# Patient Record
Sex: MALE | Race: WHITE | Employment: OTHER | URBAN - METROPOLITAN AREA
[De-identification: names, ages, dates, MRNs, and addresses within clinical notes are randomized per-mention and may not be internally consistent; named-entity substitution may affect disease eponyms.]

---

## 2018-05-25 ENCOUNTER — HOSPITAL ENCOUNTER (OUTPATIENT)
Dept: SURGERY | Age: 58
Discharge: HOME | End: 2018-05-25

## 2018-06-06 RX ORDER — PHENOL/SODIUM PHENOLATE
20 AEROSOL, SPRAY (ML) MUCOUS MEMBRANE
COMMUNITY

## 2018-06-06 NOTE — PERIOP NOTES
Clarified meds with patient. Patient verbalized understanding to take omeprazole, and fluvoxamine dos.

## 2018-06-07 ENCOUNTER — ANESTHESIA EVENT (OUTPATIENT)
Dept: SURGERY | Age: 58
End: 2018-06-07
Payer: COMMERCIAL

## 2018-06-08 ENCOUNTER — HOSPITAL ENCOUNTER (OUTPATIENT)
Age: 58
Setting detail: OUTPATIENT SURGERY
Discharge: HOME | End: 2018-06-08
Attending: PODIATRIST | Admitting: PODIATRIST
Payer: COMMERCIAL

## 2018-06-08 ENCOUNTER — ANESTHESIA (OUTPATIENT)
Dept: SURGERY | Age: 58
End: 2018-06-08
Payer: COMMERCIAL

## 2018-06-08 ENCOUNTER — APPOINTMENT (OUTPATIENT)
Dept: GENERAL RADIOLOGY | Age: 58
End: 2018-06-08
Attending: PODIATRIST
Payer: COMMERCIAL

## 2018-06-08 VITALS
TEMPERATURE: 97.5 F | SYSTOLIC BLOOD PRESSURE: 110 MMHG | RESPIRATION RATE: 14 BRPM | HEART RATE: 49 BPM | BODY MASS INDEX: 19.37 KG/M2 | WEIGHT: 135 LBS | OXYGEN SATURATION: 100 % | DIASTOLIC BLOOD PRESSURE: 65 MMHG

## 2018-06-08 PROCEDURE — 74011250637 HC RX REV CODE- 250/637: Performed by: ANESTHESIOLOGY

## 2018-06-08 PROCEDURE — 76060000033 HC ANESTHESIA 1 TO 1.5 HR: Performed by: PODIATRIST

## 2018-06-08 PROCEDURE — 77030011640 HC PAD GRND REM COVD -A: Performed by: PODIATRIST

## 2018-06-08 PROCEDURE — 74011250636 HC RX REV CODE- 250/636

## 2018-06-08 PROCEDURE — 77030000032 HC CUF TRNQT ZIMM -B: Performed by: PODIATRIST

## 2018-06-08 PROCEDURE — 74011000250 HC RX REV CODE- 250: Performed by: PODIATRIST

## 2018-06-08 PROCEDURE — 77030006788 HC BLD SAW OSC STRY -B: Performed by: PODIATRIST

## 2018-06-08 PROCEDURE — C1776 JOINT DEVICE (IMPLANTABLE): HCPCS | Performed by: PODIATRIST

## 2018-06-08 PROCEDURE — 76010010054 HC POST OP PAIN BLOCK: Performed by: PODIATRIST

## 2018-06-08 PROCEDURE — 77030003602 HC NDL NRV BLK BBMI -B: Performed by: ANESTHESIOLOGY

## 2018-06-08 PROCEDURE — 77030018836 HC SOL IRR NACL ICUM -A: Performed by: PODIATRIST

## 2018-06-08 PROCEDURE — 74011000250 HC RX REV CODE- 250

## 2018-06-08 PROCEDURE — 76210000020 HC REC RM PH II FIRST 0.5 HR: Performed by: PODIATRIST

## 2018-06-08 PROCEDURE — 74011250636 HC RX REV CODE- 250/636: Performed by: PODIATRIST

## 2018-06-08 PROCEDURE — 76942 ECHO GUIDE FOR BIOPSY: CPT | Performed by: PODIATRIST

## 2018-06-08 PROCEDURE — 76010000161 HC OR TIME 1 TO 1.5 HR INTENSV-TIER 1: Performed by: PODIATRIST

## 2018-06-08 PROCEDURE — 77030031139 HC SUT VCRL2 J&J -A: Performed by: PODIATRIST

## 2018-06-08 PROCEDURE — 77030004413 HC BUR OVL STRY -B: Performed by: PODIATRIST

## 2018-06-08 PROCEDURE — 74011250636 HC RX REV CODE- 250/636: Performed by: ANESTHESIOLOGY

## 2018-06-08 PROCEDURE — 76210000063 HC OR PH I REC FIRST 0.5 HR: Performed by: PODIATRIST

## 2018-06-08 DEVICE — JOINT METATRSL MTP 10MM --: Type: IMPLANTABLE DEVICE | Site: TOE | Status: FUNCTIONAL

## 2018-06-08 RX ORDER — CEFAZOLIN SODIUM/WATER 2 G/20 ML
2 SYRINGE (ML) INTRAVENOUS ONCE
Status: COMPLETED | OUTPATIENT
Start: 2018-06-08 | End: 2018-06-08

## 2018-06-08 RX ORDER — ALBUTEROL SULFATE 0.83 MG/ML
2.5 SOLUTION RESPIRATORY (INHALATION) AS NEEDED
Status: DISCONTINUED | OUTPATIENT
Start: 2018-06-08 | End: 2018-06-08 | Stop reason: HOSPADM

## 2018-06-08 RX ORDER — SODIUM CHLORIDE 0.9 % (FLUSH) 0.9 %
5-10 SYRINGE (ML) INJECTION AS NEEDED
Status: DISCONTINUED | OUTPATIENT
Start: 2018-06-08 | End: 2018-06-08 | Stop reason: HOSPADM

## 2018-06-08 RX ORDER — HYDROMORPHONE HYDROCHLORIDE 2 MG/ML
0.5 INJECTION, SOLUTION INTRAMUSCULAR; INTRAVENOUS; SUBCUTANEOUS
Status: DISCONTINUED | OUTPATIENT
Start: 2018-06-08 | End: 2018-06-08 | Stop reason: HOSPADM

## 2018-06-08 RX ORDER — SODIUM CHLORIDE, SODIUM LACTATE, POTASSIUM CHLORIDE, CALCIUM CHLORIDE 600; 310; 30; 20 MG/100ML; MG/100ML; MG/100ML; MG/100ML
1000 INJECTION, SOLUTION INTRAVENOUS CONTINUOUS
Status: DISCONTINUED | OUTPATIENT
Start: 2018-06-08 | End: 2018-06-08 | Stop reason: HOSPADM

## 2018-06-08 RX ORDER — DEXAMETHASONE SODIUM PHOSPHATE 100 MG/10ML
INJECTION INTRAMUSCULAR; INTRAVENOUS AS NEEDED
Status: DISCONTINUED | OUTPATIENT
Start: 2018-06-08 | End: 2018-06-08 | Stop reason: HOSPADM

## 2018-06-08 RX ORDER — EPHEDRINE SULFATE 50 MG/ML
INJECTION, SOLUTION INTRAVENOUS AS NEEDED
Status: DISCONTINUED | OUTPATIENT
Start: 2018-06-08 | End: 2018-06-08 | Stop reason: HOSPADM

## 2018-06-08 RX ORDER — MIDAZOLAM HYDROCHLORIDE 1 MG/ML
2 INJECTION, SOLUTION INTRAMUSCULAR; INTRAVENOUS
Status: COMPLETED | OUTPATIENT
Start: 2018-06-08 | End: 2018-06-08

## 2018-06-08 RX ORDER — ROPIVACAINE HYDROCHLORIDE 5 MG/ML
INJECTION, SOLUTION EPIDURAL; INFILTRATION; PERINEURAL AS NEEDED
Status: DISCONTINUED | OUTPATIENT
Start: 2018-06-08 | End: 2018-06-08 | Stop reason: HOSPADM

## 2018-06-08 RX ORDER — DEXAMETHASONE SODIUM PHOSPHATE 4 MG/ML
INJECTION, SOLUTION INTRA-ARTICULAR; INTRALESIONAL; INTRAMUSCULAR; INTRAVENOUS; SOFT TISSUE AS NEEDED
Status: DISCONTINUED | OUTPATIENT
Start: 2018-06-08 | End: 2018-06-08 | Stop reason: HOSPADM

## 2018-06-08 RX ORDER — ACETAMINOPHEN 500 MG
1000 TABLET ORAL ONCE
Status: COMPLETED | OUTPATIENT
Start: 2018-06-08 | End: 2018-06-08

## 2018-06-08 RX ORDER — SODIUM CHLORIDE 0.9 % (FLUSH) 0.9 %
5-10 SYRINGE (ML) INJECTION EVERY 8 HOURS
Status: DISCONTINUED | OUTPATIENT
Start: 2018-06-08 | End: 2018-06-08 | Stop reason: HOSPADM

## 2018-06-08 RX ORDER — PROPOFOL 10 MG/ML
INJECTION, EMULSION INTRAVENOUS AS NEEDED
Status: DISCONTINUED | OUTPATIENT
Start: 2018-06-08 | End: 2018-06-08 | Stop reason: HOSPADM

## 2018-06-08 RX ORDER — LIDOCAINE HYDROCHLORIDE 20 MG/ML
INJECTION, SOLUTION EPIDURAL; INFILTRATION; INTRACAUDAL; PERINEURAL AS NEEDED
Status: DISCONTINUED | OUTPATIENT
Start: 2018-06-08 | End: 2018-06-08 | Stop reason: HOSPADM

## 2018-06-08 RX ORDER — LIDOCAINE HYDROCHLORIDE 10 MG/ML
0.1 INJECTION INFILTRATION; PERINEURAL AS NEEDED
Status: DISCONTINUED | OUTPATIENT
Start: 2018-06-08 | End: 2018-06-08 | Stop reason: HOSPADM

## 2018-06-08 RX ORDER — BUPIVACAINE HYDROCHLORIDE 2.5 MG/ML
INJECTION, SOLUTION EPIDURAL; INFILTRATION; INTRACAUDAL AS NEEDED
Status: DISCONTINUED | OUTPATIENT
Start: 2018-06-08 | End: 2018-06-08 | Stop reason: HOSPADM

## 2018-06-08 RX ORDER — ONDANSETRON 2 MG/ML
4 INJECTION INTRAMUSCULAR; INTRAVENOUS
Status: DISCONTINUED | OUTPATIENT
Start: 2018-06-08 | End: 2018-06-08 | Stop reason: HOSPADM

## 2018-06-08 RX ORDER — PROPOFOL 10 MG/ML
INJECTION, EMULSION INTRAVENOUS
Status: DISCONTINUED | OUTPATIENT
Start: 2018-06-08 | End: 2018-06-08 | Stop reason: HOSPADM

## 2018-06-08 RX ADMIN — LIDOCAINE HYDROCHLORIDE 80 MG: 20 INJECTION, SOLUTION EPIDURAL; INFILTRATION; INTRACAUDAL; PERINEURAL at 10:34

## 2018-06-08 RX ADMIN — ACETAMINOPHEN 1000 MG: 500 TABLET, FILM COATED ORAL at 10:06

## 2018-06-08 RX ADMIN — PROPOFOL 20 MG: 10 INJECTION, EMULSION INTRAVENOUS at 10:36

## 2018-06-08 RX ADMIN — PROPOFOL 50 MG: 10 INJECTION, EMULSION INTRAVENOUS at 10:34

## 2018-06-08 RX ADMIN — MIDAZOLAM HYDROCHLORIDE 2 MG: 1 INJECTION, SOLUTION INTRAMUSCULAR; INTRAVENOUS at 10:11

## 2018-06-08 RX ADMIN — ROPIVACAINE HYDROCHLORIDE 30 ML: 5 INJECTION, SOLUTION EPIDURAL; INFILTRATION; PERINEURAL at 10:18

## 2018-06-08 RX ADMIN — EPHEDRINE SULFATE 5 MG: 50 INJECTION, SOLUTION INTRAVENOUS at 10:51

## 2018-06-08 RX ADMIN — PROPOFOL 140 MCG/KG/MIN: 10 INJECTION, EMULSION INTRAVENOUS at 10:34

## 2018-06-08 RX ADMIN — SODIUM CHLORIDE, SODIUM LACTATE, POTASSIUM CHLORIDE, AND CALCIUM CHLORIDE 1000 ML: 600; 310; 30; 20 INJECTION, SOLUTION INTRAVENOUS at 10:07

## 2018-06-08 RX ADMIN — Medication 2 G: at 10:38

## 2018-06-08 RX ADMIN — EPHEDRINE SULFATE 5 MG: 50 INJECTION, SOLUTION INTRAVENOUS at 11:03

## 2018-06-08 RX ADMIN — DEXAMETHASONE SODIUM PHOSPHATE 4 MG: 4 INJECTION, SOLUTION INTRA-ARTICULAR; INTRALESIONAL; INTRAMUSCULAR; INTRAVENOUS; SOFT TISSUE at 10:42

## 2018-06-08 NOTE — ANESTHESIA PREPROCEDURE EVALUATION
Anesthetic History   No history of anesthetic complications            Review of Systems / Medical History  Patient summary reviewed and pertinent labs reviewed    Pulmonary  Within defined limits                 Neuro/Psych   Within defined limits           Cardiovascular    Hypertension              Exercise tolerance: >4 METS     GI/Hepatic/Renal     GERD: well controlled           Endo/Other        Arthritis     Other Findings              Physical Exam    Airway  Mallampati: II  TM Distance: 4 - 6 cm  Neck ROM: normal range of motion   Mouth opening: Normal     Cardiovascular    Rhythm: regular  Rate: normal      Pertinent negatives: No murmur, JVD and peripheral edema   Dental  No notable dental hx       Pulmonary  Breath sounds clear to auscultation               Abdominal         Other Findings            Anesthetic Plan    ASA: 2  Anesthesia type: total IV anesthesia      Post-op pain plan if not by surgeon: peripheral nerve block single    Induction: Intravenous  Anesthetic plan and risks discussed with: Patient

## 2018-06-08 NOTE — ANESTHESIA POSTPROCEDURE EVALUATION
Post-Anesthesia Evaluation and Assessment    Patient: Cecile Molina MRN: [id number]  SSN: [id number]    YOB: 1960  Age: 65 y.o. Sex: male       Cardiovascular Function/Vital Signs  Visit Vitals    /74    Pulse (!) 39    Temp 36.5 °C (97.7 °F)    Resp 16    Wt 61.2 kg (135 lb)    SpO2 100%    BMI 19.37 kg/m2       Patient is status post No value filed. anesthesia for Procedure(s):  LEFT FOOT 1ST MTP JOINT CHEILECTOMY WITH CARTIVA IMPLANT. Nausea/Vomiting: None    Postoperative hydration reviewed and adequate. Pain:  Pain Scale 1: Numeric (0 - 10) (06/08/18 0910)  Pain Intensity 1: 4 (06/08/18 0910)   Managed    Neurological Status:   Neuro (WDL): Within Defined Limits (06/08/18 0912)   At baseline    Mental Status and Level of Consciousness: Arousable    Pulmonary Status:   O2 Device: Nasal cannula (06/08/18 1011)   Adequate oxygenation and airway patent    Complications related to anesthesia: None    Post-anesthesia assessment completed.  No concerns    Signed By: Claudia Maurer MD     June 8, 2018

## 2018-06-08 NOTE — DISCHARGE INSTRUCTIONS
Please schedule a follow-up appointment for this patient with Dr. Anuja Ruiz [facility] - [phone] for 1 week. Please update the discharge instructions with the appointment details. Post Op Podiatric Surgery Orders    1. Elevate foot / ankle. 2. Ice to foot / ankle. 3. Post op shoe walk as tolerated. 4. Manage pain as per anesthesia. 5. Follow up appointment is on: one week      at the [facility] office. ACTIVITY  · As tolerated and as directed by your doctor. · Bathe or shower as directed by your doctor. DIET  · Clear liquids until no nausea or vomiting; then light diet for the first day. · Advance to regular diet on second day, unless your doctor orders otherwise. · If nausea and vomiting continues, call your doctor. PAIN  · Take pain medication as directed by your doctor. · Call your doctor if pain is NOT relieved by medication. · DO NOT take aspirin of blood thinners unless directed by your doctor. DRESSING CARE       CALL YOUR DOCTOR IF   · Excessive bleeding that does not stop after holding pressure over the area  · Temperature of 101 degrees F or above  · Excessive redness, swelling or bruising, and/ or green or yellow, smelly discharge from incision    AFTER ANESTHESIA   · For the first 24 hours: DO NOT Drive, Drink alcoholic beverages, or Make important decisions. · Be aware of dizziness following anesthesia and while taking pain medication. APPOINTMENT DATE/ TIME    YOUR DOCTOR'S PHONE NUMBER       DISCHARGE SUMMARY from Nurse    PATIENT INSTRUCTIONS:    After general anesthesia or intravenous sedation, for 24 hours or while taking prescription Narcotics:  · Limit your activities  · Do not drive and operate hazardous machinery  · Do not make important personal or business decisions  · Do  not drink alcoholic beverages  · If you have not urinated within 8 hours after discharge, please contact your surgeon on call.     *  Please give a list of your current medications to your Primary Care Provider. *  Please update this list whenever your medications are discontinued, doses are      changed, or new medications (including over-the-counter products) are added. *  Please carry medication information at all times in case of emergency situations. These are general instructions for a healthy lifestyle:    No smoking/ No tobacco products/ Avoid exposure to second hand smoke    Surgeon General's Warning:  Quitting smoking now greatly reduces serious risk to your health. Obesity, smoking, and sedentary lifestyle greatly increases your risk for illness    A healthy diet, regular physical exercise & weight monitoring are important for maintaining a healthy lifestyle    You may be retaining fluid if you have a history of heart failure or if you experience any of the following symptoms:  Weight gain of 3 pounds or more overnight or 5 pounds in a week, increased swelling in our hands or feet or shortness of breath while lying flat in bed. Please call your doctor as soon as you notice any of these symptoms; do not wait until your next office visit. Recognize signs and symptoms of STROKE:    F-face looks uneven    A-arms unable to move or move unevenly    S-speech slurred or non-existent    T-time-call 911 as soon as signs and symptoms begin-DO NOT go       Back to bed or wait to see if you get better-TIME IS BRAIN.

## 2018-06-08 NOTE — OP NOTES
[facility]  OPERATIVE REPORT    Yaron Ribeiro  MR#: [id number]  : 1960  ACCOUNT #: [de-identified]   DATE OF SERVICE: 2018    SURGEON:  Olya Klein DPM    ASSISTANT SURGEON:  None. PREOPERATIVE DIAGNOSES:  1.  Painful hallux rigidus with arthritis, left big toe joint. 2.  Pain. 3.  Difficulty walking. POSTOPERATIVE DIAGNOSES:  1.  Painful hallux rigidus with arthritis, left big toe joint. 2.  Pain. 3.  Difficulty walking. PROCEDURE:  First MTP joint cheilectomy with Cartiva implant, left first MTP joint. ANESTHESIA:  IV sedation with local, regional consisting of a popliteal saphenous nerve block to the left lower extremity and postop local into the foot. HEMOSTASIS:  Ankle pneumatic tourniquet inflated to 250 mmHg for 47 total minutes. ESTIMATED BLOOD LOSS:  Less than 1 mL. IMPLANTS AND MATERIALS:  3-0 Vicryl, 4-0 Vicryl, 5-0 Vicryl, 10 mm Cartiva implant. PATHOLOGY:  None. COMPLICATIONS:  None. INDICATIONS FOR PROCEDURE:  The patient presented to my office with a long-term history of left first MTP joint pain and thickness around the joint which causes discomfort with activities at times. It has gotten worse over the years and after conservative care and reviewing the radiographs with him in detail, we elected surgical management by cleaning up the joint and placing a Cartiva implant to hopefully provide long-term decreased pain. He agrees and I did obtain informed consent, answering all his questions in the office preoperatively. DESCRIPTION OF OPERATION:  The patient brought into the operating room, placed in a supine position on the operating table. Following IV sedation and the patient receiving a popliteal saphenous nerve block in the left lower extremity, the foot was scrubbed, prepped, and draped in the usual sterile fashion.     Attention was now directed to the dorsal medial surface of the first MTP joint where a linear incision approximately 5 cm in length was made. Following sharp and blunt dissection, care to preserve major neurovascular structures, the incision was taken down to the level of the first MTP joint capsule. A linear capsulotomy was performed and there was a significant amount of hypertrophic bone and loose osseous bony fragments periarticular big toe joint. Further blunt dissection to dissect laterally and medially the capsule of the big toe joint exposed all this hypertrophic bone, which I removed with power and blunt instrumentation. Further inspection of the first metatarsal head showed a significant central cartilaginous defect. There were also signs of mild synovitis around the capsule and within the joint along with possible gouty tophi. The wound was copiously irrigated with normal sterile saline several times. I used a McGlamry elevator at this point to free up any plantar tissues around the joint and hopefully this will allow an increase in dorsiflexion of the big toe long-term postoperatively. I then now proceeded to use a rotary bur to smooth down any rough osseous edges on both sides of the joint and then placed the 10 mm Cartiva implant into the first metatarsal head in typical fashion. The wound was copiously irrigated with normal sterile saline several times. I then sewed and sutured the capsule with 3-0 Vicryl, deep and superficial with 4-0 Vicryl, skin with 5-0 Vicryl in subcuticular fashion. The C-arm was now brought into the field for intraoperative fluoroscopy and there was a significant amount of increase in joint space on the lateral and the AP views, with the implant found to be seated well in the first metatarsal head, slightly protruding into the joint. At this point, I injected a total of 6 mL in a 5:1 mixture of 0.25% plain Marcaine and dex phosphate 4 mg in a Porter block fashion. Steri-Strips were applied. Adaptic, 4 x 4 bandages, Sarha, and Coban. The patient tolerated both the procedure and anesthesia without apparent complications and vital signs remained stable throughout the procedure. The patient transported from the operating room to recovery room with vascular status intact to his left foot and vital signs normal.  Good capillary refill was noted to reoccur in all 5 toes on the left foot. The patient's family friend was given written and verbal postop instructions along with a pain prescription, antibiotic, and he will follow up in the office in 1 week.       ANNETTE Dhaliwal / Madiha Alvarado  D: 06/08/2018 12:03     T: 06/08/2018 13:43  JOB #: [id number]  CC: _____ _____

## 2018-06-08 NOTE — ANESTHESIA PROCEDURE NOTES
Peripheral Block    Start time: 6/8/2018 10:15 AM  End time: 6/8/2018 10:18 AM  Performed by: Julia Garcia  Authorized by: Julia Garcia       Pre-procedure: Indications: at surgeon's request and post-op pain management    Preanesthetic Checklist: patient identified, risks and benefits discussed, site marked, timeout performed, anesthesia consent given and patient being monitored    Timeout Time: 10:15          Block Type:   Block Type:   Adductor canal  Laterality:  Left  Monitoring:  Continuous pulse ox, frequent vital sign checks, heart rate, oxygen and responsive to questions  Injection Technique:  Single shot  Procedures: ultrasound guided    Patient Position: supine  Prep: chlorhexidine    Location:  Mid thigh  Needle Gauge:  20 G  Needle Localization:  Ultrasound guidance  Medication Injected:  0.5%  ropivacaine  Volume (mL):  12    Assessment:  Number of attempts:  1  Injection Assessment:  Incremental injection every 5 mL, local visualized surrounding nerve on ultrasound, negative aspiration for blood, no intravascular symptoms, no paresthesia and ultrasound image on chart  Patient tolerance:  Patient tolerated the procedure well with no immediate complications

## 2018-06-08 NOTE — BRIEF OP NOTE
BRIEF OPERATIVE NOTE    Date of Procedure: 6/8/2018   Preoperative Diagnosis: Hallux rigidus of left foot [M20.22]  Foot pain, left [M79.672]  Postoperative Diagnosis: Hallux rigidus of left foot [M20.22]  Foot pain, left [M79.672]    Procedure(s):  LEFT FOOT 1ST MTP JOINT CHEILECTOMY WITH CARTIVA IMPLANT  Surgeon(s) and Role:     * Bartolo Santiago DPM - Primary         Surgical Assistant: none    Surgical Staff:  Circ-1: Beecher Blizzard, RN  Circ-Relief: Felipe Crum RN  Radiology Technician: Lito Yarbrough  Scrub Tech-1: Baljit Bazzi  Event Time In   Incision Start 1045   Incision Close 1131     Anesthesia: Regional with post op local to the left foot  Estimated Blood Loss: less than 1 cc  Specimens: * No specimens in log *   Findings: hypertrophic 1st mtp joint with loose bony fragments left foot  Complications: none  Implants:   Implant Name Type Inv.  Item Serial No.  Lot No. LRB No. Used Action   JOINT METATRSL MTP 10MM --  - [id number]   JOINT METATRSL MTP 10MM --    [employer]ART[employer] [id number] Left 1 Implanted

## 2018-06-08 NOTE — IP AVS SNAPSHOT
[address] [facility] 
 
 
 [address] 
[phone] Patient: Diomedes Schuler MRN: [id number] :1960 About your hospitalization You were admitted on:  2018 You last received care in the:  [facility] OP PACU You were discharged on:  2018 Why you were hospitalized Your primary diagnosis was:  Not on File Follow-up Information Follow up With Details Comments Contact Info Anahi Mayer MD   [facility] [address] 
[phone] Follow up in 1 week(s) For wound re-check [place] ANNETTE Hernandez  Keep follow up appointment as scheduled. [address] [facility] [employer] [address] [place] [address] 
[phone] Discharge Orders None A check berhane indicates which time of day the medication should be taken. My Medications CONTINUE taking these medications Instructions Each Dose to Equal  
 Morning Noon Evening Bedtime  
 atenolol 100 mg tablet Commonly known as:  TENORMIN Your last dose was: Your next dose is: Take 100 mg by mouth daily. Indications: pm  
 100 mg  
    
   
   
   
  
 D3-2000 PO Your last dose was: Your next dose is: Take 1 Cap by mouth. 1 Cap  
    
   
   
   
  
 diazePAM 5 mg tablet Commonly known as:  VALIUM Your last dose was: Your next dose is: Take 5 mg by mouth nightly. 5 mg FISH  mg Cap Generic drug:  Omega-3 Fatty Acids Your last dose was: Your next dose is: Take 1 Cap by mouth. 1 Cap  
    
   
   
   
  
 fluvoxaMINE 100 mg tablet Commonly known as:  Liz Pope Your last dose was: Your next dose is: Take 100 mg by mouth two (2) times a day. Takes 1/2 in the am and pm.  
 100 mg  
    
   
   
   
  
 lisinopril 10 mg tablet Commonly known as:  Toney Triana Your last dose was: Your next dose is: Take 10 mg by mouth daily. Indications: pm  
 10 mg MOTRIN  mg tablet Generic drug:  ibuprofen Your last dose was: Your next dose is: Take 200 mg by mouth daily. 200 mg  
    
   
   
   
  
 multivitamin tablet Commonly known as:  ONE A DAY Your last dose was: Your next dose is: Take 1 Tab by mouth daily. 1 Tab Omeprazole delayed release 20 mg tablet Commonly known as:  PRILOSEC D/R Your last dose was: Your next dose is: Take 20 mg by mouth every morning. 20 mg  
    
   
   
   
  
 TURMERIC (BULK) Your last dose was: Your next dose is:    
   
   
 1 Cap by Does Not Apply route. 1 Cap Discharge Instructions Please schedule a follow-up appointment for this patient with Dr. Cynthia Orona [facility] [phone]) for 1 week. Please update the discharge instructions with the appointment details. [facility] Podiatric Surgery Orders 1. Elevate foot / ankle. 2. Ice to foot / ankle. 3. Post op shoe walk as tolerated. 4. Manage pain as per anesthesia. 5. Follow up appointment is on: one week 
    at the [place] office. ACTIVITY · As tolerated and as directed by your doctor. · Bathe or shower as directed by your doctor. DIET · Clear liquids until no nausea or vomiting; then light diet for the first day. · Advance to regular diet on second day, unless your doctor orders otherwise. · If nausea and vomiting continues, call your doctor. PAIN 
· Take pain medication as directed by your doctor. · Call your doctor if pain is NOT relieved by medication. · DO NOT take aspirin of blood thinners unless directed by your doctor.   
 
DRESSING CARE  
 
 
CALL YOUR DOCTOR IF  
 · Excessive bleeding that does not stop after holding pressure over the area · Temperature of 101 degrees F or above · Excessive redness, swelling or bruising, and/ or green or yellow, smelly discharge from incision AFTER ANESTHESIA · For the first 24 hours: DO NOT Drive, Drink alcoholic beverages, or Make important decisions. · Be aware of dizziness following anesthesia and while taking pain medication. APPOINTMENT DATE/ TIME 
 
YOUR DOCTOR'S PHONE NUMBER  
 
 
DISCHARGE SUMMARY from Nurse PATIENT INSTRUCTIONS: 
 
After general anesthesia or intravenous sedation, for 24 hours or while taking prescription Narcotics: · Limit your activities · Do not drive and operate hazardous machinery · Do not make important personal or business decisions · Do  not drink alcoholic beverages · If you have not urinated within 8 hours after discharge, please contact your surgeon on call. *  Please give a list of your current medications to your Primary Care Provider. *  Please update this list whenever your medications are discontinued, doses are 
    changed, or new medications (including over-the-counter products) are added. *  Please carry medication information at all times in case of emergency situations. These are general instructions for a healthy lifestyle: No smoking/ No tobacco products/ Avoid exposure to second hand smoke Surgeon General's Warning:  Quitting smoking now greatly reduces serious risk to your health. Obesity, smoking, and sedentary lifestyle greatly increases your risk for illness A healthy diet, regular physical exercise & weight monitoring are important for maintaining a healthy lifestyle You may be retaining fluid if you have a history of heart failure or if you experience any of the following symptoms:  Weight gain of 3 pounds or more overnight or 5 pounds in a week, increased swelling in our hands or feet or shortness of breath while lying flat in bed. Please call your doctor as soon as you notice any of these symptoms; do not wait until your next office visit. Recognize signs and symptoms of STROKE: 
 
F-face looks uneven A-arms unable to move or move unevenly S-speech slurred or non-existent T-time-call 911 as soon as signs and symptoms begin-DO NOT go Back to bed or wait to see if you get better-TIME IS BRAIN. Introducing [facility]! Sapna Thomas introduces [employer] patient portal. Now you can access parts of your medical record, email your doctor's office, and request medication refills online. 1. In your internet browser, go to [email or web address] 2. Click on the First Time User? Click Here link in the Sign In box. You will see the New Member Sign Up page. 3. Enter your [employer] Access Code exactly as it appears below. You will not need to use this code after youve completed the sign-up process. If you do not sign up before the expiration date, you must request a new code. · [employer] Access Code: [email or web address] Expires: 9/3/2018  3:44 PM 
 
4. Enter the last four digits of your Social Security Number (xxxx) and Date of Birth (mm/dd/yyyy) as indicated and click Submit. You will be taken to the next sign-up page. 5. Create a [employer] ID. This will be your [employer] login ID and cannot be changed, so think of one that is secure and easy to remember. 6. Create a [employer] password. You can change your password at any time. 7. Enter your Password Reset Question and Answer. This can be used at a later time if you forget your password. 8. Enter your e-mail address. You will receive e-mail notification when new information is available in [address] 9. Click Sign Up. You can now view and download portions of your medical record. 10. Click the Download Summary menu link to download a portable copy of your medical information. If you have questions, please visit the Frequently Asked Questions section of the MyChart website. Remember, [employer] is NOT to be used for urgent needs. For medical emergencies, dial 911. Now available from your iPhone and Android! Introducing [employer] Ugarte As a [employer] patient, I wanted to make you aware of our electronic visit tool called [employer] U[employer]e. [employer] 24/7 allows you to connect within minutes with a medical provider 24 hours a day, seven days a week via a mobile device or tablet or logging into a secure website from your computer. You can access Bj Ugarte from anywhere in the [place]. A virtual visit might be right for you when you have a simple condition and feel like you just dont want to get out of bed, or cant get away from work for an appointment, when your regular [employer] provider is not available (evenings, weekends or holidays), or when youre out of town and need minor care. Electronic visits cost only $49 and if the [employer] 24/7 provider determines a prescription is needed to treat your condition, one can be electronically transmitted to a nearby pharmacy*. Please take a moment to enroll today if you have not already done so. The enrollment process is free and takes just a few minutes. To enroll, please download the [employer] 24/7 linda to your tablet or phone, or visit [email or web address] to enroll on your computer. And, as an [address] [place] [address] patient with a [employer] account, the results of your visits will be scanned into your electronic medical record and your primary care provider will be able to view the scanned results. We urge you to continue to see your regular [employer] provider for your ongoing medical care.   And while your primary care provider may not be the one available when you seek a Bj Ugarte virtual visit, the peace of mind you get from getting a real diagnosis real time can be priceless. For more information on Bj Buckdeept[employer], view our Frequently Asked Questions (FAQs) at [email or web address]. Sincerely, 
 
Jewell Jackson MD 
Chief Medical Officer [address] *:  certain medications cannot be prescribed via [employer]gart[employer] Unresulted Labs-Please follow up with your PCP about these lab tests Order Current Status NC XR TECHNOLOGIST SERVICE In process Providers Seen During Your Hospitalization Provider Specialty Primary office phone [facility] [phone], 1400 [facility] [phone] Your Primary Care Physician (PCP) Primary Care Physician Office Phone Office Fax 89 [facility], [address] [place] [address] You are allergic to the following Allergen Reactions Sulfa (Sulfonamide Antibiotics) Rash Recent Documentation Weight BMI Smoking Status 61.2 kg 19.37 kg/m2 Former Smoker Emergency Contacts Name Discharge Info Relation Home Work Mobile Minh Brusnon  Mother [14] [phone] Tiera Tapia  Sister [23] [phone] Patient Belongings The following personal items are in your possession at time of discharge: 
  Dental Appliances: None         Home Medications: None   Jewelry: None  Clothing: Undergarments, Pants, Shirt    Other Valuables: None Please provide this summary of care documentation to your next provider. Signatures-by signing, you are acknowledging that this After Visit Summary has been reviewed with you and you have received a copy. Patient Signature:  ____________________________________________________________ Date:  ____________________________________________________________  
  
Cristopher Sahni Provider Signature:  ____________________________________________________________ Date:  ____________________________________________________________

## 2018-06-08 NOTE — ANESTHESIA PROCEDURE NOTES
Peripheral Block    Start time: 6/8/2018 10:11 AM  End time: 6/8/2018 10:15 AM  Performed by: Suhail Winston  Authorized by: Suhail Winston       Pre-procedure:    Indications: at surgeon's request and post-op pain management    Preanesthetic Checklist: patient identified, risks and benefits discussed, site marked, timeout performed, anesthesia consent given and patient being monitored    Timeout Time: 10:11          Block Type:   Block Type:  Popliteal  Laterality:  Left  Monitoring:  Continuous pulse ox, frequent vital sign checks, heart rate, oxygen and responsive to questions  Injection Technique:  Single shot  Procedures: ultrasound guided    Prep: chlorhexidine    Location:  Lower thigh  Needle Type:  Stimuplex  Needle Gauge:  20 G  Needle Localization:  Ultrasound guidance  Medication Injected:  0.5%  ropivacaine  Volume (mL):  18    Assessment:  Number of attempts:  1  Injection Assessment:  Incremental injection every 5 mL, local visualized surrounding nerve on ultrasound, negative aspiration for blood, no intravascular symptoms, no paresthesia and ultrasound image on chart  Patient tolerance:  Patient tolerated the procedure well with no immediate complications

## 2018-08-31 NOTE — H&P
Patient: Bethany Fung MRN: [id number]  SSN: [id number] YOB: 1960  Age: 63 y.o. Sex: male History and Physical 
 
Logan Carroll is a 63 y.o. male having Procedure(s): LEFT FOOT 1ST MTP JOINT CHEILECTOMY WITH CARTIVA IMPLANT. Allergies: Allergies Allergen Reactions  Sulfa (Sulfonamide Antibiotics) Rash Chief Complaint: foot pain History of Present Illness: 64 yo M failed medical therapy and presents for surgical treatment. Past Medical History:  
Diagnosis Date  Arthritis   
 osteoarthritis  Chronic pain   
 arthritis  GERD (gastroesophageal reflux disease) omeprazole daily--well controlled.  Hypertension Past Surgical History:  
Procedure Laterality Date [address]  HX ORTHOPAEDIC  1970  
 hurmerous fx- steel plate placed x2  HX TYMPANOSTOMY    
 x 3 as a child Family History Problem Relation Age of Onset  Cancer Mother  Diabetes Father  Heart Disease Father  Cancer Father  Mental Retardation Father Social History Substance Use Topics  Smoking status: Former Smoker Packs/day: 1.00 Years: 10.00 Quit date: 1965  Smokeless tobacco: Never Used  Alcohol use No  
  
 
Prior to Admission medications Medication Sig Start Date End Date Taking? Authorizing Provider Omeprazole delayed release (PRILOSEC D/R) 20 mg tablet Take 20 mg by mouth every morning. Yes Historical Provider  
atenolol (TENORMIN) 100 mg tablet Take 100 mg by mouth daily. Indications: pm   Yes Historical Provider  
lisinopril (PRINIVIL, ZESTRIL) 10 mg tablet Take 10 mg by mouth daily. Indications: pm   Yes Historical Provider  
fluvoxaMINE (LUVOX) 100 mg tablet Take 100 mg by mouth two (2) times a day. Takes 1/2 in the am and pm.   Yes Historical Provider  
diazePAM (VALIUM) 5 mg tablet Take 5 mg by mouth nightly. Yes Historical Provider multivitamin (ONE A DAY) tablet Take 1 Tab by mouth daily. Historical Provider Omega-3 Fatty Acids (FISH OIL) 500 mg cap Take 1 Cap by mouth. Historical Provider  
cholecalciferol, vitamin D3, (D3-2000 PO) Take 1 Cap by mouth. Historical Provider TURMERIC, BULK, 1 Cap by Does Not Apply route. Historical Provider  
ibuprofen (MOTRIN IB) 200 mg tablet Take 200 mg by mouth daily. Historical Provider Visit Vitals  /65  Pulse (!) 49  Temp 36.4 °C (97.5 °F)  Resp 14  Wt 61.2 kg (135 lb)  SpO2 100%  BMI 19.37 kg/m2 Assessment and Plan:  
Amanda Haider is a 63 y.o. male having Procedure(s): LEFT FOOT 1ST MTP JOINT CHEILECTOMY WITH CARTIVA IMPLANT for foot pain. Preanesthesia Evaluation No anesthesia note exists Signed By: Radha Mares MD   
 August 31, 2018

## 2019-12-30 ENCOUNTER — ANESTHESIA EVENT (OUTPATIENT)
Dept: SURGERY | Age: 59
End: 2019-12-30
Payer: COMMERCIAL

## 2019-12-31 ENCOUNTER — HOSPITAL ENCOUNTER (OUTPATIENT)
Age: 59
Setting detail: OUTPATIENT SURGERY
Discharge: HOME | End: 2019-12-31
Attending: PODIATRIST | Admitting: PODIATRIST
Payer: COMMERCIAL

## 2019-12-31 ENCOUNTER — ANESTHESIA (OUTPATIENT)
Dept: SURGERY | Age: 59
End: 2019-12-31
Payer: COMMERCIAL

## 2019-12-31 ENCOUNTER — APPOINTMENT (OUTPATIENT)
Dept: GENERAL RADIOLOGY | Age: 59
End: 2019-12-31
Attending: PODIATRIST
Payer: COMMERCIAL

## 2019-12-31 VITALS
RESPIRATION RATE: 16 BRPM | SYSTOLIC BLOOD PRESSURE: 125 MMHG | HEIGHT: 70 IN | DIASTOLIC BLOOD PRESSURE: 80 MMHG | TEMPERATURE: 98.5 F | HEART RATE: 52 BPM | WEIGHT: 170 LBS | BODY MASS INDEX: 24.34 KG/M2 | OXYGEN SATURATION: 97 %

## 2019-12-31 PROCEDURE — C1713 ANCHOR/SCREW BN/BN,TIS/BN: HCPCS | Performed by: PODIATRIST

## 2019-12-31 PROCEDURE — 77030036714 HC WRE K FIXOS STRY -B: Performed by: PODIATRIST

## 2019-12-31 PROCEDURE — 77030040198: Performed by: PODIATRIST

## 2019-12-31 PROCEDURE — 77030018836 HC SOL IRR NACL ICUM -A: Performed by: PODIATRIST

## 2019-12-31 PROCEDURE — C1769 GUIDE WIRE: HCPCS | Performed by: PODIATRIST

## 2019-12-31 PROCEDURE — 77030039543 HC COUNTRSNK FIXOS DISP STRY -C: Performed by: PODIATRIST

## 2019-12-31 PROCEDURE — 76010010054 HC POST OP PAIN BLOCK: Performed by: PODIATRIST

## 2019-12-31 PROCEDURE — 77030033138 HC SUT PGA STRATFX J&J -B: Performed by: PODIATRIST

## 2019-12-31 PROCEDURE — 77030031139 HC SUT VCRL2 J&J -A: Performed by: PODIATRIST

## 2019-12-31 PROCEDURE — 77030003602 HC NDL NRV BLK BBMI -B: Performed by: ANESTHESIOLOGY

## 2019-12-31 PROCEDURE — 76210000063 HC OR PH I REC FIRST 0.5 HR: Performed by: PODIATRIST

## 2019-12-31 PROCEDURE — 74011250636 HC RX REV CODE- 250/636: Performed by: ANESTHESIOLOGY

## 2019-12-31 PROCEDURE — 77030037713 HC CLOSR DEV INCIS ZIP STRY -B: Performed by: PODIATRIST

## 2019-12-31 PROCEDURE — 77030032490 HC SLV COMPR SCD KNE COVD -B: Performed by: PODIATRIST

## 2019-12-31 PROCEDURE — 74011250636 HC RX REV CODE- 250/636: Performed by: NURSE ANESTHETIST, CERTIFIED REGISTERED

## 2019-12-31 PROCEDURE — 74011250636 HC RX REV CODE- 250/636: Performed by: PODIATRIST

## 2019-12-31 PROCEDURE — 74011000250 HC RX REV CODE- 250: Performed by: PODIATRIST

## 2019-12-31 PROCEDURE — 76060000035 HC ANESTHESIA 2 TO 2.5 HR: Performed by: PODIATRIST

## 2019-12-31 PROCEDURE — 76010000162 HC OR TIME 1.5 TO 2 HR INTENSV-TIER 1: Performed by: PODIATRIST

## 2019-12-31 PROCEDURE — 77030036719 HC BIT DRL FIXOS STRY -C: Performed by: PODIATRIST

## 2019-12-31 PROCEDURE — 74011000250 HC RX REV CODE- 250: Performed by: NURSE ANESTHETIST, CERTIFIED REGISTERED

## 2019-12-31 PROCEDURE — 77030018384: Performed by: PODIATRIST

## 2019-12-31 PROCEDURE — 77030000032 HC CUF TRNQT ZIMM -B: Performed by: PODIATRIST

## 2019-12-31 PROCEDURE — 76942 ECHO GUIDE FOR BIOPSY: CPT | Performed by: PODIATRIST

## 2019-12-31 PROCEDURE — 76210000021 HC REC RM PH II 0.5 TO 1 HR: Performed by: PODIATRIST

## 2019-12-31 PROCEDURE — 77030040922 HC BLNKT HYPOTHRM STRY -A: Performed by: ANESTHESIOLOGY

## 2019-12-31 DEVICE — HOLDING PIN
Type: IMPLANTABLE DEVICE | Site: FOOT | Status: FUNCTIONAL
Brand: ANCHORAGE

## 2019-12-31 DEVICE — HEADLESS COMPRESSION SCREW
Type: IMPLANTABLE DEVICE | Site: FOOT | Status: FUNCTIONAL
Brand: FIXOS

## 2019-12-31 DEVICE — NON LOCKING SCREW
Type: IMPLANTABLE DEVICE | Site: FOOT | Status: FUNCTIONAL
Brand: ANCHORAGE

## 2019-12-31 DEVICE — PLATE MTP, LEFT
Type: IMPLANTABLE DEVICE | Site: FOOT | Status: FUNCTIONAL
Brand: ANCHORAGE

## 2019-12-31 DEVICE — BIOACTIVE BONE GRAFT SUBSTITUTE, FOAM PACK; BETA-TRICALCIUM PHOSPHATE, TYPE I BOVINE COLLAGEN, AND BIOACTIVE GLASS
Type: IMPLANTABLE DEVICE | Site: FOOT | Status: FUNCTIONAL
Brand: VITOSS BBTRAUMA

## 2019-12-31 RX ORDER — MIDAZOLAM HYDROCHLORIDE 1 MG/ML
INJECTION, SOLUTION INTRAMUSCULAR; INTRAVENOUS AS NEEDED
Status: DISCONTINUED | OUTPATIENT
Start: 2019-12-31 | End: 2019-12-31 | Stop reason: HOSPADM

## 2019-12-31 RX ORDER — NALOXONE HYDROCHLORIDE 0.4 MG/ML
0.1 INJECTION, SOLUTION INTRAMUSCULAR; INTRAVENOUS; SUBCUTANEOUS
Status: DISCONTINUED | OUTPATIENT
Start: 2019-12-31 | End: 2019-12-31 | Stop reason: HOSPADM

## 2019-12-31 RX ORDER — MIDAZOLAM HYDROCHLORIDE 1 MG/ML
2 INJECTION, SOLUTION INTRAMUSCULAR; INTRAVENOUS
Status: COMPLETED | OUTPATIENT
Start: 2019-12-31 | End: 2019-12-31

## 2019-12-31 RX ORDER — MIDAZOLAM HYDROCHLORIDE 1 MG/ML
2 INJECTION, SOLUTION INTRAMUSCULAR; INTRAVENOUS
Status: DISCONTINUED | OUTPATIENT
Start: 2019-12-31 | End: 2019-12-31 | Stop reason: HOSPADM

## 2019-12-31 RX ORDER — SODIUM CHLORIDE 0.9 % (FLUSH) 0.9 %
5-40 SYRINGE (ML) INJECTION EVERY 8 HOURS
Status: DISCONTINUED | OUTPATIENT
Start: 2019-12-31 | End: 2019-12-31 | Stop reason: HOSPADM

## 2019-12-31 RX ORDER — SODIUM CHLORIDE, SODIUM LACTATE, POTASSIUM CHLORIDE, CALCIUM CHLORIDE 600; 310; 30; 20 MG/100ML; MG/100ML; MG/100ML; MG/100ML
75 INJECTION, SOLUTION INTRAVENOUS CONTINUOUS
Status: DISCONTINUED | OUTPATIENT
Start: 2019-12-31 | End: 2019-12-31 | Stop reason: HOSPADM

## 2019-12-31 RX ORDER — DEXAMETHASONE SODIUM PHOSPHATE 100 MG/10ML
INJECTION INTRAMUSCULAR; INTRAVENOUS AS NEEDED
Status: DISCONTINUED | OUTPATIENT
Start: 2019-12-31 | End: 2019-12-31 | Stop reason: HOSPADM

## 2019-12-31 RX ORDER — FENTANYL CITRATE 50 UG/ML
100 INJECTION, SOLUTION INTRAMUSCULAR; INTRAVENOUS
Status: COMPLETED | OUTPATIENT
Start: 2019-12-31 | End: 2019-12-31

## 2019-12-31 RX ORDER — EPHEDRINE SULFATE/0.9% NACL/PF 50 MG/5 ML
SYRINGE (ML) INTRAVENOUS AS NEEDED
Status: DISCONTINUED | OUTPATIENT
Start: 2019-12-31 | End: 2019-12-31 | Stop reason: HOSPADM

## 2019-12-31 RX ORDER — LIDOCAINE HYDROCHLORIDE 10 MG/ML
0.1 INJECTION INFILTRATION; PERINEURAL AS NEEDED
Status: DISCONTINUED | OUTPATIENT
Start: 2019-12-31 | End: 2019-12-31 | Stop reason: HOSPADM

## 2019-12-31 RX ORDER — CEFAZOLIN SODIUM/WATER 2 G/20 ML
2 SYRINGE (ML) INTRAVENOUS ONCE
Status: COMPLETED | OUTPATIENT
Start: 2019-12-31 | End: 2019-12-31

## 2019-12-31 RX ORDER — HYDROMORPHONE HYDROCHLORIDE 2 MG/ML
0.5 INJECTION, SOLUTION INTRAMUSCULAR; INTRAVENOUS; SUBCUTANEOUS
Status: DISCONTINUED | OUTPATIENT
Start: 2019-12-31 | End: 2019-12-31 | Stop reason: HOSPADM

## 2019-12-31 RX ORDER — FLUMAZENIL 0.1 MG/ML
0.2 INJECTION INTRAVENOUS
Status: DISCONTINUED | OUTPATIENT
Start: 2019-12-31 | End: 2019-12-31 | Stop reason: HOSPADM

## 2019-12-31 RX ORDER — OXYCODONE HYDROCHLORIDE 5 MG/1
5 TABLET ORAL
Status: DISCONTINUED | OUTPATIENT
Start: 2019-12-31 | End: 2019-12-31 | Stop reason: HOSPADM

## 2019-12-31 RX ORDER — SODIUM CHLORIDE, SODIUM LACTATE, POTASSIUM CHLORIDE, CALCIUM CHLORIDE 600; 310; 30; 20 MG/100ML; MG/100ML; MG/100ML; MG/100ML
100 INJECTION, SOLUTION INTRAVENOUS CONTINUOUS
Status: DISCONTINUED | OUTPATIENT
Start: 2019-12-31 | End: 2019-12-31 | Stop reason: HOSPADM

## 2019-12-31 RX ORDER — PROPOFOL 10 MG/ML
INJECTION, EMULSION INTRAVENOUS
Status: DISCONTINUED | OUTPATIENT
Start: 2019-12-31 | End: 2019-12-31 | Stop reason: HOSPADM

## 2019-12-31 RX ORDER — DIPHENHYDRAMINE HYDROCHLORIDE 50 MG/ML
12.5 INJECTION, SOLUTION INTRAMUSCULAR; INTRAVENOUS
Status: DISCONTINUED | OUTPATIENT
Start: 2019-12-31 | End: 2019-12-31 | Stop reason: HOSPADM

## 2019-12-31 RX ORDER — LIDOCAINE HYDROCHLORIDE 5 MG/ML
INJECTION, SOLUTION INFILTRATION; INTRAVENOUS AS NEEDED
Status: DISCONTINUED | OUTPATIENT
Start: 2019-12-31 | End: 2019-12-31 | Stop reason: HOSPADM

## 2019-12-31 RX ORDER — PROPOFOL 10 MG/ML
INJECTION, EMULSION INTRAVENOUS AS NEEDED
Status: DISCONTINUED | OUTPATIENT
Start: 2019-12-31 | End: 2019-12-31 | Stop reason: HOSPADM

## 2019-12-31 RX ORDER — SODIUM CHLORIDE 0.9 % (FLUSH) 0.9 %
5-40 SYRINGE (ML) INJECTION AS NEEDED
Status: DISCONTINUED | OUTPATIENT
Start: 2019-12-31 | End: 2019-12-31 | Stop reason: HOSPADM

## 2019-12-31 RX ADMIN — SODIUM CHLORIDE, SODIUM LACTATE, POTASSIUM CHLORIDE, AND CALCIUM CHLORIDE 100 ML/HR: 600; 310; 30; 20 INJECTION, SOLUTION INTRAVENOUS at 12:40

## 2019-12-31 RX ADMIN — PROPOFOL 20 MG: 10 INJECTION, EMULSION INTRAVENOUS at 14:21

## 2019-12-31 RX ADMIN — PROPOFOL 160 MCG/KG/MIN: 10 INJECTION, EMULSION INTRAVENOUS at 14:18

## 2019-12-31 RX ADMIN — MIDAZOLAM HYDROCHLORIDE 2 MG: 2 INJECTION, SOLUTION INTRAMUSCULAR; INTRAVENOUS at 14:15

## 2019-12-31 RX ADMIN — MEPIVACAINE HYDROCHLORIDE 10 ML: 20 INJECTION, SOLUTION EPIDURAL; INFILTRATION at 12:58

## 2019-12-31 RX ADMIN — ROPIVACAINE HYDROCHLORIDE 20 ML: 5 INJECTION, SOLUTION EPIDURAL; INFILTRATION; PERINEURAL at 12:54

## 2019-12-31 RX ADMIN — MEPIVACAINE HYDROCHLORIDE 10 ML: 20 INJECTION, SOLUTION EPIDURAL; INFILTRATION at 12:54

## 2019-12-31 RX ADMIN — Medication 10 MG: at 15:04

## 2019-12-31 RX ADMIN — ROPIVACAINE HYDROCHLORIDE 15 ML: 5 INJECTION, SOLUTION EPIDURAL; INFILTRATION; PERINEURAL at 12:58

## 2019-12-31 RX ADMIN — PROPOFOL 20 MG: 10 INJECTION, EMULSION INTRAVENOUS at 14:18

## 2019-12-31 RX ADMIN — MIDAZOLAM 2 MG: 1 INJECTION INTRAMUSCULAR; INTRAVENOUS at 12:49

## 2019-12-31 RX ADMIN — Medication 2 G: at 14:17

## 2019-12-31 RX ADMIN — FENTANYL CITRATE 50 MCG: 50 INJECTION, SOLUTION INTRAMUSCULAR; INTRAVENOUS at 12:49

## 2019-12-31 RX ADMIN — PROPOFOL 20 MG: 10 INJECTION, EMULSION INTRAVENOUS at 14:19

## 2019-12-31 RX ADMIN — Medication 10 MG: at 14:49

## 2019-12-31 RX ADMIN — PROPOFOL 20 MG: 10 INJECTION, EMULSION INTRAVENOUS at 14:20

## 2019-12-31 NOTE — BRIEF OP NOTE
BRIEF OPERATIVE NOTE    Date of Procedure: 12/31/2019   Preoperative Diagnosis: Arthralgia of left foot [M25.572]  Primary osteoarthritis, unspecified site [M19.91]  Postoperative Diagnosis: Arthralgia of left foot [M25.572]  Primary osteoarthritis, unspecified site [M19.91]    Procedure(s):  LEFT FOOT 1ST METATARSAL PHALANGEAL JOINT ARTHRODESIS  Surgeon(s) and Role:     * Jean Claude Brumfield DPM - Primary     * Nati Carias DPM - Assisting         Surgical Assistant:    Surgical Staff:  Circ-1: Nena Lowe RN  Radiology Technician: Stewart Caban, RT, R, CT  Scrub Tech-1: Felipe Diaz  Event Time In Time Out   Incision Start 1429    Incision Close 1558      Anesthesia: Regional with post op local to left foot  Estimated Blood Loss: less than one cc  Specimens: none   Findings: hypertrophic 1st mtp joint with a compressed Cartiva implant that receded into the 1st metatarsal head leftfoot  Complications: none  Implants:   Implant Name Type Inv.  Item Serial No.  Lot No. LRB No. Used Action   GRAFT BNE FOAM BBTRAUMA 2.5ML -- VITOSS - [id number]  GRAFT BNE FOAM BBTRAUMA 2.5ML -- VITOSS  [employer] HOW [id number] Left 1 Implanted   SCR HDLSS COMPR [id number] -- FIXOS - [id number]  SCR HDLSS COMPR [id number] -- FIXOS  [employer] HOW [id number] Left 1 Implanted

## 2019-12-31 NOTE — ANESTHESIA POSTPROCEDURE EVALUATION
Procedure(s):  LEFT FOOT 1ST METATARSAL PHALANGEAL JOINT ARTHRODESIS.    total IV anesthesia    Anesthesia Post Evaluation      Multimodal analgesia: multimodal analgesia used between 6 hours prior to anesthesia start to PACU discharge  Patient location during evaluation: PACU  Patient participation: complete - patient participated  Level of consciousness: awake and awake and alert  Pain management: adequate  Airway patency: patent  Anesthetic complications: no  Cardiovascular status: acceptable  Respiratory status: acceptable  Hydration status: acceptable  Post anesthesia nausea and vomiting:  controlled      Vitals Value Taken Time   /67 12/31/2019  4:25 PM   Temp 36.6 °C (97.8 °F) 12/31/2019  4:11 PM   Pulse 48 12/31/2019  4:29 PM   Resp 14 12/31/2019  4:14 PM   SpO2 99 % 12/31/2019  4:29 PM   Vitals shown include unvalidated device data.

## 2019-12-31 NOTE — DISCHARGE INSTRUCTIONS
Post Op Podiatric Surgery Orders    1. Elevate foot / ankle. 2. Ice to foot / ankle. 3. No post weightbearing is allowed to the left foot  4. Manage pain as per anesthesia. 5. Follow up appointment is on: one week      at the [address] [place] [address] Drive office. DIET  · Clear liquids until no nausea or vomiting; then light diet for the first day. · Advance to regular diet on second day, unless your doctor orders otherwise. · If nausea and vomiting continues, call your doctor. PAIN  · Take pain medication as directed by your doctor. · Call your doctor if pain is NOT relieved by medication. · DO NOT take aspirin of blood thinners unless directed by your doctor. CALL YOUR DOCTOR IF   · Excessive bleeding that does not stop after holding pressure over the area  · Temperature of 101 degrees F or above  · Excessive redness, swelling or bruising, and/ or green or yellow, smelly discharge from incision    AFTER ANESTHESIA   · For the first 24 hours: DO NOT Drive, Drink alcoholic beverages, or Make important decisions. · Be aware of dizziness following anesthesia and while taking pain medication. After general anesthesia or intravenous sedation, for 24 hours or while taking prescription Narcotics:  · Limit your activities  · A responsible adult needs to be with you for the next 24 hours  · Do not drive and operate hazardous machinery  · Do not make important personal or business decisions  · Do  not drink alcoholic beverages  · If you have not urinated within 8 hours after discharge, please contact your surgeon on call. · If you have sleep apnea and have a CPAP machine, please use it for all naps and sleeping. · Please use caution when taking narcotics and any of your home medications that may cause drowsiness. *  Please give a list of your current medications to your Primary Care Provider.   *  Please update this list whenever your medications are discontinued, doses are      changed, or new medications (including over-the-counter products) are added. *  Please carry medication information at all times in case of emergency situations. These are general instructions for a healthy lifestyle:  No smoking/ No tobacco products/ Avoid exposure to second hand smoke  Surgeon General's Warning:  Quitting smoking now greatly reduces serious risk to your health. Obesity, smoking, and sedentary lifestyle greatly increases your risk for illness  A healthy diet, regular physical exercise & weight monitoring are important for maintaining a healthy lifestyle    You may be retaining fluid if you have a history of heart failure or if you experience any of the following symptoms:  Weight gain of 3 pounds or more overnight or 5 pounds in a week, increased swelling in our hands or feet or shortness of breath while lying flat in bed. Please call your doctor as soon as you notice any of these symptoms; do not wait until your next office visit.

## 2019-12-31 NOTE — ANESTHESIA PROCEDURE NOTES
Peripheral Block    Start time: 12/31/2019 12:50 PM  End time: 12/31/2019 12:54 PM  Performed by: Cristine Haas MD  Authorized by: Cristine Haas MD       Pre-procedure:    Indications: at surgeon's request and post-op pain management    Preanesthetic Checklist: patient identified, risks and benefits discussed, site marked, timeout performed, anesthesia consent given and patient being monitored    Timeout Time: 12:49          Block Type:   Block Type:  Popliteal  Laterality:  Left  Monitoring:  Standard ASA monitoring, continuous pulse ox, frequent vital sign checks, heart rate, responsive to questions and oxygen  Injection Technique:  Single shot  Procedures: ultrasound guided and nerve stimulator    Patient Position: supine  Prep: chlorhexidine    Location:  Lower thigh  Needle Type:  Stimuplex  Needle Gauge:  22 G  Needle Localization:  Anatomical landmarks, ultrasound guidance and nerve stimulator  Motor Response: minimal motor response >0.4 mA      Assessment:  Number of attempts:  1  Injection Assessment:  Incremental injection every 5 mL, local visualized surrounding nerve on ultrasound, negative aspiration for CSF, negative aspiration for blood, no paresthesia, no intravascular symptoms and ultrasound image on chart  Patient tolerance:  Patient tolerated the procedure well with no immediate complications

## 2019-12-31 NOTE — H&P
Patient: Manuel Khoury MRN: [id number]  SSN: [id number]    YOB: 1960  Age: 63 y.o. Sex: male      History and Physical    Manuel Khoury is a 63 y.o. male having Procedure(s):  LEFT FOOT 1ST METATARSAL PHALANGEAL JOINT ARTHRODESIS  POPLITEAL SAPHENOUS NERVE BLOCK. Allergies: Allergies   Allergen Reactions    Statins-Hmg-Coa Reductase Inhibitors Myalgia    Sulfa (Sulfonamide Antibiotics) Rash        Chief Complaint: Left Foot Pain     History of Present Illness: 63year old with left foot pain    Past Medical History:   Diagnosis Date    Arthritis     osteoarthritis    Chronic pain     arthritis    GERD (gastroesophageal reflux disease)     \"weak esophogeal sphincter\"-- omeprazole daily--2-3\" HOB elevation     Hypertension     managed with meds    OCD (obsessive compulsive disorder)     Snores     has not had sleep study / wakes up often      Past Surgical History:   Procedure Laterality Date    HX ADENOIDECTOMY  1971    HX HERNIA REPAIR      HX HERNIA REPAIR      HX ORTHOPAEDIC Left 1970    hurmerous fx- steel plate placed x2    HX ORTHOPAEDIC Left     LEFT FOOT 1ST MTP JOINT CHEILECTOMY WITH CARTIVA IMPLANT     HX TYMPANOSTOMY      x 3 as a child      Family History   Problem Relation Age of Onset    Cancer Mother     Diabetes Father     Heart Disease Father     Cancer Father     Dementia Father         vascular dementia    Drug Abuse Brother       Social History     Tobacco Use    Smoking status: Former Smoker     Packs/day: 1.00     Years: 10.00     Pack years: 10.00     Last attempt to quit: 2005     Years since quitting: 15.0    Smokeless tobacco: Never Used   Substance Use Topics    Alcohol use: No        Prior to Admission medications    Medication Sig Start Date End Date Taking? Authorizing Provider   Omeprazole delayed release (PRILOSEC D/R) 20 mg tablet Take 20 mg by mouth every morning.    Yes Provider, Historical   atenolol (TENORMIN) 100 mg tablet Take 100 mg by mouth nightly. Indications: pm   Yes Provider, Historical   lisinopril (PRINIVIL, ZESTRIL) 10 mg tablet Take 10 mg by mouth daily. Indications: pm   Yes Provider, Historical   multivitamin (ONE A DAY) tablet Take 1 Tab by mouth daily. Yes Provider, Historical   Omega-3 Fatty Acids (FISH OIL) 500 mg cap Take 1 Cap by mouth daily. Hold for procedure   Yes Provider, Historical   cholecalciferol, vitamin D3, (D3-2000 PO) Take 1 Cap by mouth. Yes Provider, Historical   fluvoxaMINE (LUVOX) 100 mg tablet Take 50 mg by mouth two (2) times a day. 100 mg tab-- 1/2 in the am and pm.  Indications: Obsessive Compulsive Disorder   Yes Provider, Historical   diazePAM (VALIUM) 5 mg tablet Take 5 mg by mouth nightly. Yes Provider, Historical        Visit Vitals  /78 (BP 1 Location: Left arm, BP Patient Position: At rest)   Pulse (!) 45   Temp 36.4 °C (97.6 °F)   Resp 18   Ht 5' 10\" (1.778 m)   Wt 77.1 kg (170 lb)   SpO2 99%   BMI 24.39 kg/m²        Assessment and Plan:   Dania Garcia is a 63 y.o. male having Procedure(s):  LEFT FOOT 1ST METATARSAL PHALANGEAL JOINT ARTHRODESIS  POPLITEAL SAPHENOUS NERVE BLOCK for left foot pain.     Preanesthesia Evaluation     Last edited 12/31/19 1247 by Analy Westbrook MD  Date of Service 12/31/19 1247             Anesthetic History   No history of anesthetic complications            Review of Systems / Medical History  Patient summary reviewed and pertinent labs reviewed    Pulmonary  Within defined limits                 Neuro/Psych   Within defined limits           Cardiovascular    Hypertension: well controlled              Exercise tolerance: >4 METS     GI/Hepatic/Renal     GERD: well controlled           Endo/Other        Arthritis     Other Findings              Physical Exam    Airway  Mallampati: II  TM Distance: 4 - 6 cm  Neck ROM: normal range of motion   Mouth opening: Normal    Comments: Full beard Cardiovascular    Rhythm: regular  Rate: normal Dental  No notable dental hx       Pulmonary  Breath sounds clear to auscultation               Abdominal         Other Findings            Anesthetic Plan    ASA: 2  Anesthesia type: total IV anesthesia      Post-op pain plan if not by surgeon: peripheral nerve block single    Induction: Intravenous  Anesthetic plan and risks discussed with: Patient               Preanesthesia evaluation performed by Lanie Boswell MD            Signed By: Brianda Hamilton MD     December 31, 2019

## 2019-12-31 NOTE — ANESTHESIA PROCEDURE NOTES
Peripheral Block    Start time: 12/31/2019 12:56 PM  End time: 12/31/2019 12:58 PM  Performed by: Preston Najera MD  Authorized by: Preston Najera MD       Pre-procedure: Indications: at surgeon's request and post-op pain management    Preanesthetic Checklist: patient identified, risks and benefits discussed, site marked, timeout performed, anesthesia consent given and patient being monitored    Timeout Time: 12:55          Block Type:   Block Type:   Adductor canal  Laterality:  Left  Monitoring:  Standard ASA monitoring, continuous pulse ox, frequent vital sign checks, heart rate, responsive to questions and oxygen  Injection Technique:  Single shot  Procedures: ultrasound guided    Patient Position: supine  Prep: chlorhexidine    Location:  Mid thigh  Needle Type:  Stimuplex  Needle Gauge:  22 G  Needle Localization:  Ultrasound guidance    Assessment:  Number of attempts:  1  Injection Assessment:  Incremental injection every 5 mL, local visualized surrounding nerve on ultrasound, negative aspiration for CSF, negative aspiration for blood, no paresthesia, no intravascular symptoms and ultrasound image on chart  Patient tolerance:  Patient tolerated the procedure well with no immediate complications

## 2019-12-31 NOTE — ANESTHESIA PREPROCEDURE EVALUATION
Anesthetic History   No history of anesthetic complications            Review of Systems / Medical History  Patient summary reviewed and pertinent labs reviewed    Pulmonary  Within defined limits                 Neuro/Psych   Within defined limits           Cardiovascular    Hypertension: well controlled              Exercise tolerance: >4 METS     GI/Hepatic/Renal     GERD: well controlled           Endo/Other        Arthritis     Other Findings              Physical Exam    Airway  Mallampati: II  TM Distance: 4 - 6 cm  Neck ROM: normal range of motion   Mouth opening: Normal    Comments: Full beard Cardiovascular    Rhythm: regular  Rate: normal         Dental  No notable dental hx       Pulmonary  Breath sounds clear to auscultation               Abdominal         Other Findings            Anesthetic Plan    ASA: 2  Anesthesia type: total IV anesthesia      Post-op pain plan if not by surgeon: peripheral nerve block single    Induction: Intravenous  Anesthetic plan and risks discussed with: Patient

## 2020-01-01 NOTE — OP NOTES
[facility]  OPERATIVE REPORT    Name:  Britton Wang  MR#:  [id number]  :  1960  ACCOUNT #:  [de-identified]  DATE OF SERVICE:  2019    PREOPERATIVE DIAGNOSIS:  Painful chronic first metatarsophalangeal joint arthritis with a failed Cartiva implant left foot. POSTOPERATIVE DIAGNOSIS:  Painful chronic first metatarsophalangeal joint arthritis with a failed Cartiva implant left foot. PROCEDURE PERFORMED:  First MTP joint arthrodesis, was taken out the Cartiva implant on the left foot. SURGEON:  Salena Brooks DPM    ASSISTANT SURGEON:  Nikki Vasquez DPM    ANESTHESIA:  IV sedation with popliteal saphenous regional nerve block to the left lower extremity and postop local anesthetic. COMPLICATIONS:  None. SPECIMENS REMOVED:  Implanted Cartiva implant removed from 1st MTP joint left foot    PATHOLOGY:  None. IMPLANTS:  A 4.0 x 32-mm headless cannulated screw, a six-hole MTP plate along with a 3.5 mm x 14-mm nonlocking screw, 3.5 mm x 14-mm nonlocking screw, 3.5 mm x 12-mm nonlocking screw, 3.5 mm x 14-mm locking screw, 3.5 mm x 16-mm locking screw, 2.5 mL of Vitoss synthetic bone graft. ESTIMATED BLOOD LOSS: Less than 1 mL. HEMOSTASIS:  Ankle pneumatic tourniquet inflated to 250 mmHg for 85 minutes. MATERIALS:  3-0 Vicryl, 4-0 Vicryl, 4-0 Stratafix Monocryl. INDICATION:  The patient presented to my office with long-term history of left foot pain and chronic postop Cartiva pain at the first MTP joint in the left foot. After conservative care, failure to relieve the problems and diagnosing the arthritis getting worse on serial x-rays over the past year, we elected taking out the [facility] implant and fusing the first MTP joint as the end-stage procedure. He agrees. I obtained informed written consent and answered all his questions in the office preoperatively.     OPERATION:  The patient was brought to the operating room and placed in the supine position on the operating table. Following IV sedation and the patient receiving a popliteal saphenous nerve block to the left lower extremity, the foot was prepped and draped in the usual fashion. Attention now directed to the dorsomedial surface along the previous scar at the first MTP joint about 6 cm in length. I made the incision down to the first MTP joint capsule where I performed a linear capsulotomy. Further blunt and sharp dissection exposed the hypertrophic first MTP joint and the Cartiva implant was noted to be receded deep into the first metatarsal head proximal to the cartilaginous remaining surface. I resected all the hypertrophic bone, removed the Cartiva implant and resected the remaining cartilaginous subchondral bone at the base of the proximal phalanx as well. A 0.035-inch K-wire was used to drill holes along the first metatarsal head rim and a 0.045-inch K-wire was used to drill holes at the base of the proximal phalanx and in the Cartiva defected area of the first metatarsal head. The wound was copiously irrigated with normal sterile saline multiple times. I used 2.5 mL of Vitoss synthetic bone graft to pack the defect where the Cartiva implant was and then I placed a 4.0 mm x 32-mm headless screw in an interfrag compression of the first MTP joint, holding the toe in a more rectus alignment and slightly dorsiflexed and also parallel to the second toe. C-arm was brought into the field. After I fixated this temporary position with a K-wire, the first MTP joint was noted be in rectus alignment and well compressed at this point. And then I inserted the screw. Next, I placed the six-hole MTP plate and the remaining screws were used to fixate and hold this down dorsally.   The C-arm was brought into the field multiple times again and an AP and a lateral view was used to confirm good position of the plate and all the screws with the toe held slightly dorsiflexed in a more rectus alignment compared to the preop views. The wound was irrigated with normal sterile saline multiple times and I sutured the capsule with 3-0 Vicryl deep and superficial 4-0 Vicryl, subcuticular closure was done with a 4-0 Monocryl Stratafix suture, and I used the ZipLine to reinforce skin closure. Adaptic was placed on the foot. Dry sterile dressing, Sarah and Coban. The tourniquet released. Capillary refill returned to all five toes and I did apply posterior splint to the patient with 6-inch Ace wraps as reinforcement. The patient tolerated the procedure and anesthesia without apparent complications. Vital signs remained stable throughout the procedure. The patient was transported from the operating room to the postop care unit with vascular status intact to all his toes on the left foot. His family member was given written and verbal postop instructions along with the pain prescription and antibiotic should be taken as directed. Follow up in the office in one week and he is well aware of the nonweightbearing status over the next 6-8 weeks.         ANNETTE [id number]  D:  12/31/2019 16:21  T:  12/31/2019 23:31  JOB #:  [id number]  CC:

## 2021-11-19 ENCOUNTER — NURSE TRIAGE (OUTPATIENT)
Dept: ADMINISTRATIVE | Facility: CLINIC | Age: 61
End: 2021-11-19

## (undated) DEVICE — STRETCH BANDAGE ROLL: Brand: DERMACEA

## (undated) DEVICE — AMD ANTIMICROBIAL BANDAGE ROLL,6 PLY: Brand: KERLIX

## (undated) DEVICE — CANNULATED DRILL: Brand: FIXOS

## (undated) DEVICE — KIRSCHNER WIRE , L, TIPS TROCAR , SMOOTH
Type: IMPLANTABLE DEVICE | Site: FOOT | Status: NON-FUNCTIONAL
Removed: 2019-12-31

## (undated) DEVICE — BNDG,ELSTC,MATRIX,STRL,6"X5YD,LF,HOOK&LP: Brand: MEDLINE

## (undated) DEVICE — BANDAGE,GAUZE,CONFORMING,4"X75",STRL,LF: Brand: MEDLINE

## (undated) DEVICE — INTENDED FOR TISSUE SEPARATION, AND OTHER PROCEDURES THAT REQUIRE A SHARP SURGICAL BLADE TO PUNCTURE OR CUT.: Brand: BARD-PARKER ® STAINLESS STEEL BLADES

## (undated) DEVICE — CANNULATED COUNTERSINK: Brand: FIXOS

## (undated) DEVICE — 3M™ COBAN™ SELF-ADHERENT WRAP, 1586S, STERILE, 6 IN X 5 YD (15 CM X 4,5 M), 12 ROLLS/CASE: Brand: 3M™ COBAN™

## (undated) DEVICE — SUTURE COAT VCRL SZ 4-0 L18IN ABSRB UD L19MM PS-2 1/2 CIR J496G

## (undated) DEVICE — DRAPE C ARM W54XL84IN MINI FOR OEC 6800

## (undated) DEVICE — BUTTON SWITCH PENCIL BLADE ELECTRODE, HOLSTER: Brand: EDGE

## (undated) DEVICE — SUTURE STRATAFIX SPRL MCRYL + SZ 4-0 L12IN ABSRB UD PS-2 SXMP1B117

## (undated) DEVICE — STOCKINETTE TUBE 6X48 -- MEDICHOICE

## (undated) DEVICE — SKIN MARKER,REGULAR TIP WITH RULER AND LABELS: Brand: DEVON

## (undated) DEVICE — GOWN,PREVENTION PLUS,XL,ST,24/CS: Brand: MEDLINE

## (undated) DEVICE — 1010 S-DRAPE TOWEL DRAPE 10/BX: Brand: STERI-DRAPE™

## (undated) DEVICE — STOCKINETTE: Brand: DEROYAL

## (undated) DEVICE — WIRE FIX K 2 TRCR 0.9MMX15.2CM --
Type: IMPLANTABLE DEVICE | Site: FOOT | Status: NON-FUNCTIONAL
Removed: 2019-12-31

## (undated) DEVICE — 4.0MM EGG BUR

## (undated) DEVICE — DRSG GZ OIL EMUL CURAD 3X8 --

## (undated) DEVICE — TRAY PREP DRY W/ PREM GLV 2 APPL 6 SPNG 2 UNDPD 1 OVERWRAP

## (undated) DEVICE — STANDARD DRILL BIT

## (undated) DEVICE — BNDG ELAS ESMARK 4INX12FT LF -- STRL

## (undated) DEVICE — SUTURE VCRL SZ 3-0 L18IN ABSRB UD PS-2 L19MM 1/2 CIR J497G

## (undated) DEVICE — Device

## (undated) DEVICE — ZIMMER® STERILE DISPOSABLE TOURNIQUET CUFF WITH PLC, DUAL PORT, SINGLE BLADDER, 18 IN. (46 CM)

## (undated) DEVICE — SOLUTION IV 1000ML 0.9% SOD CHL

## (undated) DEVICE — STOCKINETTE,DOUBLE PLY,6X48,STERILE: Brand: MEDLINE

## (undated) DEVICE — KENDALL SCD EXPRESS SLEEVES, KNEE LENGTH, MEDIUM: Brand: KENDALL SCD

## (undated) DEVICE — AMD ANTIMICROBIAL GAUZE SPONGES,12 PLY USP TYPE VII, 0.2% POLYHEXAMETHYLENE BIGUANIDE HCI (PHMB): Brand: CURITY

## (undated) DEVICE — BANDAGE COMPR SELF ADH 5 YDX4 IN TAN STRL PREMIERPRO LF

## (undated) DEVICE — STANDARD HYPODERMIC NEEDLE,POLYPROPYLENE HUB: Brand: MONOJECT

## (undated) DEVICE — CURITY NON-ADHERENT STRIPS: Brand: CURITY

## (undated) DEVICE — PADDING CAST COHESIVE 4 YDX3 IN HND TEARABLE COTTON SPEC 100

## (undated) DEVICE — 2000CC GUARDIAN II: Brand: GUARDIAN

## (undated) DEVICE — THREADED GUIDE WIRE

## (undated) DEVICE — SPLINT FBRGLS W3XL35IN PRECUT ORTHOGLASS

## (undated) DEVICE — SUTURE VCRL SZ 4-0 L27IN ABSRB UD L19MM PS-2 3/8 CIR PRIM J426H

## (undated) DEVICE — BNDG,ELSTC,MATRIX,STRL,3"X5YD,LF,HOOK&LP: Brand: MEDLINE

## (undated) DEVICE — PADDING CAST W4INXL4YD ST COT COHESIVE HND TEARABLE SPEC

## (undated) DEVICE — REM POLYHESIVE ADULT PATIENT RETURN ELECTRODE: Brand: VALLEYLAB

## (undated) DEVICE — BNDG,ELSTC,MATRIX,STRL,4"X5YD,LF,HOOK&LP: Brand: MEDLINE

## (undated) DEVICE — (D)STRIP SKN CLSR 0.5X4IN WHT --

## (undated) DEVICE — ZIMMER® STERILE DISPOSABLE TOURNIQUET CUFF, DUAL PORT, SINGLE BLADDER, 12 IN. (30 CM)

## (undated) DEVICE — PRECISION THIN (9.0 X 0.38 X 18.5MM)

## (undated) DEVICE — SUTURE COAT VCRL SZ 5-0 L18IN ABSRB UD L19MM PS-2 1/2 CIR J495G

## (undated) DEVICE — ZIP 8I SURGICAL SKIN CLOSURE DEVICE: Brand: ZIP 8I SURGICAL SKIN CLOSURE DEVICE

## (undated) DEVICE — STERILE HOOK LOCK LATEX FREE ELASTIC BANDAGE 6INX5YD: Brand: HOOK LOCK™

## (undated) DEVICE — WIRE ORTH 1.1MM DIA 229MM SMOOTH DBL BAYNT TIP S STL K: Type: IMPLANTABLE DEVICE | Site: FOOT | Status: NON-FUNCTIONAL

## (undated) DEVICE — SUTURE VCRL SZ 4-0 L18IN ABSRB UD L19MM PS-2 3/8 CIR PRIM J496H